# Patient Record
Sex: FEMALE | Race: WHITE | NOT HISPANIC OR LATINO
[De-identification: names, ages, dates, MRNs, and addresses within clinical notes are randomized per-mention and may not be internally consistent; named-entity substitution may affect disease eponyms.]

---

## 2017-10-30 ENCOUNTER — APPOINTMENT (OUTPATIENT)
Dept: OTOLARYNGOLOGY | Facility: CLINIC | Age: 37
End: 2017-10-30

## 2019-04-23 ENCOUNTER — TRANSCRIPTION ENCOUNTER (OUTPATIENT)
Age: 39
End: 2019-04-23

## 2019-10-11 ENCOUNTER — TRANSCRIPTION ENCOUNTER (OUTPATIENT)
Age: 39
End: 2019-10-11

## 2022-07-08 ENCOUNTER — APPOINTMENT (OUTPATIENT)
Dept: ORTHOPEDIC SURGERY | Facility: CLINIC | Age: 42
End: 2022-07-08

## 2023-06-05 PROBLEM — Z00.00 ENCOUNTER FOR PREVENTIVE HEALTH EXAMINATION: Noted: 2023-06-05

## 2023-06-09 ENCOUNTER — APPOINTMENT (OUTPATIENT)
Dept: ORTHOPEDIC SURGERY | Facility: CLINIC | Age: 43
End: 2023-06-09
Payer: COMMERCIAL

## 2023-06-09 VITALS — HEIGHT: 65 IN | BODY MASS INDEX: 21.66 KG/M2 | WEIGHT: 130 LBS

## 2023-06-09 DIAGNOSIS — M70.61 TROCHANTERIC BURSITIS, RIGHT HIP: ICD-10-CM

## 2023-06-09 DIAGNOSIS — M70.62 TROCHANTERIC BURSITIS, RIGHT HIP: ICD-10-CM

## 2023-06-09 DIAGNOSIS — M46.1 SACROILIITIS, NOT ELSEWHERE CLASSIFIED: ICD-10-CM

## 2023-06-09 DIAGNOSIS — M51.36 OTHER INTERVERTEBRAL DISC DEGENERATION, LUMBAR REGION: ICD-10-CM

## 2023-06-09 PROCEDURE — 72110 X-RAY EXAM L-2 SPINE 4/>VWS: CPT

## 2023-06-09 PROCEDURE — 20552 NJX 1/MLT TRIGGER POINT 1/2: CPT

## 2023-06-09 PROCEDURE — 99215 OFFICE O/P EST HI 40 MIN: CPT | Mod: 25

## 2023-06-09 PROCEDURE — 72170 X-RAY EXAM OF PELVIS: CPT

## 2023-06-09 NOTE — ASSESSMENT
[FreeTextEntry1] : May have SIJ, GT and radic\par PT, meds\par \par Trigger Point Injection- The risks, benefits, contents and alternatives to injection were explained in full to the patient.  Risks outlined include but are not limited to infection, bleeding, scarring, skin discoloration, temporary increase in pain, syncopal episode, failure to resolve symptoms, allergic reaction, flare reaction, permanent white skin discoloration, symptom recurrence, and elevation of blood sugar in diabetics.  Patient understood the risks.  All questions were answered.  After discussion of options, patient requested an injection.  Oral informed consent was obtained and sterile prep was done of the injection site.  Sterile technique was used to introduce the mixture. The mixture consisted of: \par 4 cc 1% lidocaine\par 80 mg of depomedrol\par L SIJ/paraspinal\par Patient tolerated the procedure well.  Patient advised to ice the injection site this evening.  Signs and symptoms of infection reviewed and patient advised to call immediately for redness, fevers, and/or chills.

## 2023-06-09 NOTE — HISTORY OF PRESENT ILLNESS
[Lower back] : lower back [Sudden] : sudden [10] : 10 [Radiating] : radiating [Intermittent] : intermittent [Nothing helps with pain getting better] : Nothing helps with pain getting better [de-identified] : 10/22/21 Lumbar MRI  - report noted in chart. \par Findings: Alignment appears anatomic. Vertebral body heights are maintained. There is no acute lumbar \par vertebral body fracture. The conus appears unremarkable. There are no gross paravertebral soft tissue masses. \par There is no pars defect or stress reaction. There is multilevel disc desiccation.\par L1-L2: There is no posterior disc herniation.\par L2-L3: There is no posterior disc herniation.\par L3-L4: There is a far lateral right foraminal herniation encroaching upon the right exiting L3 nerve root with \par right foraminal narrowing.\par L4-L5: There is a far right foraminal herniation encroaching upon the right exiting L4 nerve root with \par asymmetric right foraminal narrowing.\par L5-S1: There is no posterior disc herniation.\par Ind. 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 Started years ago. Hurts to the touch. Has had intermittent pain and N/T to the toes. Went to PT, no relief.  [] : no [FreeTextEntry7] : b/l legs

## 2023-06-09 NOTE — IMAGING
[de-identified] : LSPINE\par Inspection: No rash or ecchymosis\par Palpation: No tenderness to palpation or spasm in bilateral thoracic and lumbar paraspinal musculature, L SI joint tenderness to palpation\par ROM: Full with stiffness\par Strength: 5/5 bilateral hip flexors, knee extensors, ankle dorsiflexors, EHL, ankle plantarflexors\par Sensation: Sensation present to light touch bilateral L2-S1 distributions\par Provocative maneuvers: Negative bilateral straight leg raise \par \par L Hip-\par Palpation: Tenderness to palpation over greater trochanter and IT band\par ROM: No groin pain with flexion and internal rotation  [Disc space narrowing] : Disc space narrowing [AP] : anteroposterior [There are no fractures, subluxations or dislocations. No significant abnormalities are seen] : There are no fractures, subluxations or dislocations. No significant abnormalities are seen

## 2023-08-05 ENCOUNTER — APPOINTMENT (OUTPATIENT)
Dept: ORTHOPEDIC SURGERY | Facility: CLINIC | Age: 43
End: 2023-08-05

## 2023-08-10 ENCOUNTER — APPOINTMENT (OUTPATIENT)
Dept: ORTHOPEDIC SURGERY | Facility: CLINIC | Age: 43
End: 2023-08-10
Payer: COMMERCIAL

## 2023-08-10 DIAGNOSIS — M47.812 SPONDYLOSIS W/OUT MYELOPATHY OR RADICULOPATHY, CERVICAL REGION: ICD-10-CM

## 2023-08-10 PROCEDURE — 99214 OFFICE O/P EST MOD 30 MIN: CPT

## 2023-08-10 PROCEDURE — 72050 X-RAY EXAM NECK SPINE 4/5VWS: CPT

## 2023-08-10 NOTE — DISCUSSION/SUMMARY
[de-identified] : reviewed the case and the imaging  xrays okay  PT mDP indicated for updated MRi of the C spine  fu to review the MRi  rheum?

## 2023-08-10 NOTE — HISTORY OF PRESENT ILLNESS
[Neck] : neck [8] : 8 [6] : 6 [Dull/Aching] : dull/aching [Tightness] : tightness [Constant] : constant [de-identified] : 8/10/23:  42 yo RHD female with acute history of neck spasm with B shoulder pain, R>L, and numbness to base of skull. Symptoms have been present for 2 weeks after going to Accupuncture. Patient reports pain limits ROM. Patient denies change in dexterity or fine motor skills. Reports Hx of neck and low back pain.  Patient taking motrin and flexeril prn with some relief.    Has taken naproxen and flexeril in past, completing course of PT.  PmHx: TMJ has had injections in hips/back for bursitis   Occupation: Manage medical office  xrays today; C spine - slight narrowing of hte C5-6 disc hieght  [] : no [FreeTextEntry5] : no reported injury, pain started 2 weeks ago and has been constant since. Numbness/tingling down both arms, pain travels into the head causing headaches. History of neck pain.

## 2023-08-10 NOTE — PHYSICAL EXAM
[Flexion] : flexion [Extension] : extension [Rotation to left] : rotation to left [Rotation to right] : rotation to right [Straightening consistent with spasm] : Straightening consistent with spasm [Disc space narrowing] : Disc space narrowing [No spinal deformity, fracture, lytic lesion, or marked single level collapse] : No spinal deformity, fracture, lytic lesion, or marked single level collapse [] : negative Mendes reflex [FreeTextEntry1] : disc narrowing noted at C5/6

## 2023-10-09 PROBLEM — Z00.00 ENCOUNTER FOR PREVENTIVE HEALTH EXAMINATION: Status: ACTIVE | Noted: 2023-10-09

## 2023-11-20 ENCOUNTER — APPOINTMENT (OUTPATIENT)
Dept: ORTHOPEDIC SURGERY | Facility: CLINIC | Age: 43
End: 2023-11-20

## 2023-12-02 ENCOUNTER — APPOINTMENT (OUTPATIENT)
Dept: ORTHOPEDIC SURGERY | Facility: CLINIC | Age: 43
End: 2023-12-02

## 2023-12-11 ENCOUNTER — APPOINTMENT (OUTPATIENT)
Dept: OBGYN | Facility: CLINIC | Age: 43
End: 2023-12-11
Payer: COMMERCIAL

## 2023-12-11 VITALS
BODY MASS INDEX: 21.33 KG/M2 | WEIGHT: 128 LBS | DIASTOLIC BLOOD PRESSURE: 60 MMHG | HEIGHT: 65 IN | SYSTOLIC BLOOD PRESSURE: 118 MMHG

## 2023-12-11 DIAGNOSIS — F32.A ANXIETY DISORDER, UNSPECIFIED: ICD-10-CM

## 2023-12-11 DIAGNOSIS — E74.31 SUCRASE-ISOMALTASE DEFICIENCY: ICD-10-CM

## 2023-12-11 DIAGNOSIS — Z80.3 FAMILY HISTORY OF MALIGNANT NEOPLASM OF BREAST: ICD-10-CM

## 2023-12-11 DIAGNOSIS — Z82.49 FAMILY HISTORY OF ISCHEMIC HEART DISEASE AND OTHER DISEASES OF THE CIRCULATORY SYSTEM: ICD-10-CM

## 2023-12-11 DIAGNOSIS — Z13.79 ENCOUNTER FOR OTHER SCREENING FOR GENETIC AND CHROMOSOMAL ANOMALIES: ICD-10-CM

## 2023-12-11 DIAGNOSIS — Z87.891 PERSONAL HISTORY OF NICOTINE DEPENDENCE: ICD-10-CM

## 2023-12-11 DIAGNOSIS — Z01.411 ENCOUNTER FOR GYNECOLOGICAL EXAMINATION (GENERAL) (ROUTINE) WITH ABNORMAL FINDINGS: ICD-10-CM

## 2023-12-11 DIAGNOSIS — F41.9 ANXIETY DISORDER, UNSPECIFIED: ICD-10-CM

## 2023-12-11 DIAGNOSIS — Z83.3 FAMILY HISTORY OF DIABETES MELLITUS: ICD-10-CM

## 2023-12-11 DIAGNOSIS — Z84.1 FAMILY HISTORY OF DISORDERS OF KIDNEY AND URETER: ICD-10-CM

## 2023-12-11 DIAGNOSIS — N94.3 PREMENSTRUAL TENSION SYNDROME: ICD-10-CM

## 2023-12-11 DIAGNOSIS — Z78.9 OTHER SPECIFIED HEALTH STATUS: ICD-10-CM

## 2023-12-11 DIAGNOSIS — Z82.3 FAMILY HISTORY OF STROKE: ICD-10-CM

## 2023-12-11 PROCEDURE — 99386 PREV VISIT NEW AGE 40-64: CPT

## 2023-12-11 PROCEDURE — 36415 COLL VENOUS BLD VENIPUNCTURE: CPT

## 2023-12-11 RX ORDER — DICLOFENAC SODIUM 1% 10 MG/G
1 GEL TOPICAL DAILY
Qty: 1 | Refills: 3 | Status: DISCONTINUED | COMMUNITY
Start: 2023-06-09 | End: 2023-12-11

## 2023-12-11 RX ORDER — METHYLPREDNISOLONE 4 MG/1
4 TABLET ORAL
Qty: 1 | Refills: 0 | Status: DISCONTINUED | COMMUNITY
Start: 2023-08-10 | End: 2023-12-11

## 2023-12-16 PROBLEM — Z80.3 FAMILY HISTORY OF MALIGNANT NEOPLASM OF BREAST: Status: ACTIVE | Noted: 2023-12-11

## 2023-12-16 PROBLEM — Z82.49 FAMILY HISTORY OF ATRIAL FIBRILLATION: Status: ACTIVE | Noted: 2023-12-11

## 2023-12-16 PROBLEM — Z87.891 FORMER SMOKER: Status: ACTIVE | Noted: 2023-12-11

## 2023-12-16 PROBLEM — F41.9 ANXIETY AND DEPRESSION: Status: RESOLVED | Noted: 2023-12-11 | Resolved: 2023-12-16

## 2023-12-16 PROBLEM — Z82.3 FAMILY HISTORY OF CEREBROVASCULAR ACCIDENT (CVA): Status: ACTIVE | Noted: 2023-12-11

## 2023-12-16 PROBLEM — E74.31 SUCRASE-ISOMALTASE DEFICIENCY: Status: ACTIVE | Noted: 2023-12-16

## 2023-12-16 PROBLEM — Z84.1 FAMILY HISTORY OF KIDNEY DISEASE: Status: ACTIVE | Noted: 2023-12-11

## 2023-12-16 PROBLEM — Z82.49 FAMILY HISTORY OF HYPERTENSION: Status: ACTIVE | Noted: 2023-12-11

## 2023-12-16 PROBLEM — Z83.3 FAMILY HISTORY OF DIABETES MELLITUS: Status: ACTIVE | Noted: 2023-12-11

## 2023-12-16 PROBLEM — Z78.9 SOCIAL ALCOHOL USE: Status: ACTIVE | Noted: 2023-12-11

## 2023-12-16 RX ORDER — IMIPRAMINE HYDROCHLORIDE 50 MG/1
TABLET, SUGAR COATED ORAL
Refills: 0 | Status: ACTIVE | COMMUNITY

## 2023-12-16 RX ORDER — SERTRALINE HYDROCHLORIDE 25 MG/1
25 TABLET, FILM COATED ORAL
Refills: 0 | Status: ACTIVE | COMMUNITY

## 2023-12-16 NOTE — HISTORY OF PRESENT ILLNESS
[FreeTextEntry1] : 42 yo  presents for initial exam. She c/o irregular periods. She states she occasionally skips and that her periods had been q2 weeks apart in Feb and March of this year. LMP  prior 10/14, , , . She c/o moodiness and PMS at the onset of her periods and also c/o bloating. She has a hx of sucrase deficiency for which she sees a GI and has regular colonoscopies, last one . Hx of laparoscopy for infertility and she states that the findings were normal and has eventually conceived on her own.  Last pap x1 yr ago- no hx of abnl paps. Last mammo  w/ sono due to dense breasts.  BRCA questionnaire- no increased risk to warrant testing. Paternal aunt had breast cancer in 30s.  OB hx:  NSD x1

## 2023-12-16 NOTE — PHYSICAL EXAM
[TextEntry] : Constitutional: Awake, alert, and no acute distress  Breast: No breast mass, no tenderness, no nipple discharge and no axillary lymphadenopathy Abdomen: Soft and nontender Neurologic: Oriented to person, time and place Genitourinary: No abnormalities of the external genitalia, vagina, cervix and uterus. The adnexa were not tender and not enlarged.

## 2023-12-16 NOTE — PLAN
[FreeTextEntry1] : Hereditary cancer gene screening drawn and sent. Advised to c/w menstrual calendar.  Perimenopausal normal and abnl bleeding patterns discussed. Advised to call if bleeding q2 weeks apart should occur.  Mammo/ breast sono. We discussed possibly increasing Zoloft to 50 mg during the timeframe of her PMS sx.

## 2023-12-20 LAB — CYTOLOGY CVX/VAG DOC THIN PREP: NORMAL

## 2023-12-21 ENCOUNTER — NON-APPOINTMENT (OUTPATIENT)
Age: 43
End: 2023-12-21

## 2024-01-02 ENCOUNTER — NON-APPOINTMENT (OUTPATIENT)
Age: 44
End: 2024-01-02

## 2024-01-10 ENCOUNTER — APPOINTMENT (OUTPATIENT)
Dept: OBGYN | Facility: CLINIC | Age: 44
End: 2024-01-10
Payer: COMMERCIAL

## 2024-01-10 VITALS
DIASTOLIC BLOOD PRESSURE: 70 MMHG | BODY MASS INDEX: 21.33 KG/M2 | HEIGHT: 65 IN | SYSTOLIC BLOOD PRESSURE: 110 MMHG | WEIGHT: 128 LBS

## 2024-01-10 DIAGNOSIS — N93.8 OTHER SPECIFIED ABNORMAL UTERINE AND VAGINAL BLEEDING: ICD-10-CM

## 2024-01-10 PROCEDURE — 99213 OFFICE O/P EST LOW 20 MIN: CPT | Mod: 25

## 2024-01-10 PROCEDURE — 81003 URINALYSIS AUTO W/O SCOPE: CPT | Mod: QW

## 2024-01-10 PROCEDURE — 81025 URINE PREGNANCY TEST: CPT

## 2024-01-10 PROCEDURE — 58100 BIOPSY OF UTERUS LINING: CPT

## 2024-01-10 NOTE — PLAN
[FreeTextEntry1] : CBC and hormone profile drawn and sent Recommend pelvic sono next week and will review results and discuss tx plan

## 2024-01-10 NOTE — HISTORY OF PRESENT ILLNESS
[FreeTextEntry1] : 42 yo  presents for c/o light bleeding x1 mo associated with back ache and pelvic pressure. She had been seen last month and had complained of irregular periods with occasional skipping  of menses and occassional q2 weeks apart menses earlier this year. We had discussed the likelihood of perimenopausal bleeding. No bloodwork or sono was done at that visit.   BRCA questionnaire- no increased risk to warrant testing. Paternal aunt had breast cancer in 30s.  OB hx:  NSD x1

## 2024-01-10 NOTE — PHYSICAL EXAM
[TextEntry] :  Genitourinary: No abnormalities of the external genitalia, vagina, cervix and uterus. The adnexa were not tender and not enlarged.

## 2024-01-10 NOTE — PROCEDURE
[Endometrial Biopsy] : Endometrial biopsy [Consent Obtained] : Consent obtained [Irregular Bleeding] : irregular uterine bleeding [Betadine] : Betadine [Tenaculum] : Tenaculum [Required Dilation] : required dilation [Moderate] : moderate [de-identified] : tolerated poorly [TextEntry] : UCG performed prior to EMB which was negative

## 2024-01-11 LAB
ANTI-MUELLERIAN HORMONE: 0.1 NG/ML
BASOPHILS # BLD AUTO: 0.05 K/UL
BASOPHILS NFR BLD AUTO: 0.7 %
BILIRUB UR QL STRIP: NORMAL
DHEA-S SERPL-MCNC: 112 UG/DL
EOSINOPHIL # BLD AUTO: 0.08 K/UL
EOSINOPHIL NFR BLD AUTO: 1 %
ESTRADIOL SERPL-MCNC: 161 PG/ML
FSH SERPL-MCNC: 11.6 IU/L
GLUCOSE UR-MCNC: NORMAL
HCG UR QL: 0.2 EU/DL
HCG UR QL: NEGATIVE
HCT VFR BLD CALC: 41.5 %
HGB BLD-MCNC: 13.2 G/DL
HGB UR QL STRIP.AUTO: NORMAL
IMM GRANULOCYTES NFR BLD AUTO: 0.3 %
KETONES UR-MCNC: NORMAL
LEUKOCYTE ESTERASE UR QL STRIP: NORMAL
LH SERPL-ACNC: 5.1 IU/L
LYMPHOCYTES # BLD AUTO: 1.78 K/UL
LYMPHOCYTES NFR BLD AUTO: 23.3 %
MAN DIFF?: NORMAL
MCHC RBC-ENTMCNC: 26.6 PG
MCHC RBC-ENTMCNC: 31.8 GM/DL
MCV RBC AUTO: 83.5 FL
MONOCYTES # BLD AUTO: 0.57 K/UL
MONOCYTES NFR BLD AUTO: 7.5 %
NEUTROPHILS # BLD AUTO: 5.14 K/UL
NEUTROPHILS NFR BLD AUTO: 67.2 %
NITRITE UR QL STRIP: NORMAL
PH UR STRIP: 5.5
PLATELET # BLD AUTO: 280 K/UL
PROLACTIN SERPL-MCNC: 12.3 NG/ML
PROT UR STRIP-MCNC: NORMAL
QUALITY CONTROL: YES
RBC # BLD: 4.97 M/UL
RBC # FLD: 14.6 %
SP GR UR STRIP: 1
TSH SERPL-ACNC: 2.98 UIU/ML
WBC # FLD AUTO: 7.64 K/UL

## 2024-01-16 ENCOUNTER — APPOINTMENT (OUTPATIENT)
Dept: ULTRASOUND IMAGING | Facility: CLINIC | Age: 44
End: 2024-01-16
Payer: COMMERCIAL

## 2024-01-16 ENCOUNTER — OUTPATIENT (OUTPATIENT)
Dept: OUTPATIENT SERVICES | Facility: HOSPITAL | Age: 44
LOS: 1 days | End: 2024-01-16
Payer: COMMERCIAL

## 2024-01-16 DIAGNOSIS — Z00.00 ENCOUNTER FOR GENERAL ADULT MEDICAL EXAMINATION WITHOUT ABNORMAL FINDINGS: ICD-10-CM

## 2024-01-16 PROCEDURE — 76830 TRANSVAGINAL US NON-OB: CPT | Mod: 26

## 2024-01-16 PROCEDURE — 76830 TRANSVAGINAL US NON-OB: CPT

## 2024-01-24 ENCOUNTER — TRANSCRIPTION ENCOUNTER (OUTPATIENT)
Age: 44
End: 2024-01-24

## 2024-01-24 ENCOUNTER — NON-APPOINTMENT (OUTPATIENT)
Age: 44
End: 2024-01-24

## 2024-02-07 ENCOUNTER — NON-APPOINTMENT (OUTPATIENT)
Age: 44
End: 2024-02-07

## 2024-02-14 ENCOUNTER — APPOINTMENT (OUTPATIENT)
Dept: OBGYN | Facility: CLINIC | Age: 44
End: 2024-02-14
Payer: COMMERCIAL

## 2024-02-14 DIAGNOSIS — N92.1 EXCESSIVE AND FREQUENT MENSTRUATION WITH IRREGULAR CYCLE: ICD-10-CM

## 2024-02-14 PROCEDURE — 99213 OFFICE O/P EST LOW 20 MIN: CPT

## 2024-02-14 NOTE — PLAN
[FreeTextEntry1] : Perimenopause normal and abnl bleeding patterns discussed.  Pt. will track menses. If continue to be irregular, I suggested trying Mirena IUD to minimize bleeding and protect the lining of her uterus.  Pt. will call us with any other episodes of abnl bleeding or concerns.

## 2024-02-14 NOTE — END OF VISIT
[Time Spent: ___ minutes] : I have spent [unfilled] minutes of time on the encounter. [TextEntry] : Kavya Em, acting as a scribe for Dr. Flory Saldana.

## 2024-02-14 NOTE — HISTORY OF PRESENT ILLNESS
[FreeTextEntry1] : 42 yo  presents to discuss irregular periods. Her periods had been fairly regular up until . She experienced light bleeding x1 month from December -January. Workup included hormone profile, endometrial bx and TVS. All WNL. TVS-> 4mm endometrium, x2 small fibroids largest 2.4cm, normal adnexa. EMB-> proliferative endometrium. At the time of her prolonged bleeding, she was tx with Provera x12 days to stop the episode. She d/c the progesterone approx. x10 days ago and has not had withdrawal from the progesterone but states she does see light blood when she wipes after urinating. She came in to discuss what to expect in the future. Her previous c/o of L sided pelvic pain has resolved.

## 2024-03-02 RX ORDER — MEDROXYPROGESTERONE ACETATE 10 MG/1
10 TABLET ORAL DAILY
Qty: 12 | Refills: 0 | Status: ACTIVE | COMMUNITY
Start: 2024-01-17 | End: 1900-01-01

## 2024-03-06 ENCOUNTER — TRANSCRIPTION ENCOUNTER (OUTPATIENT)
Age: 44
End: 2024-03-06

## 2024-03-26 ENCOUNTER — NON-APPOINTMENT (OUTPATIENT)
Age: 44
End: 2024-03-26

## 2024-03-28 DIAGNOSIS — N64.4 MASTODYNIA: ICD-10-CM
